# Patient Record
Sex: FEMALE | URBAN - METROPOLITAN AREA
[De-identification: names, ages, dates, MRNs, and addresses within clinical notes are randomized per-mention and may not be internally consistent; named-entity substitution may affect disease eponyms.]

---

## 2017-08-03 ENCOUNTER — APPOINTMENT (RX ONLY)
Dept: URBAN - METROPOLITAN AREA CLINIC 15 | Facility: CLINIC | Age: 32
Setting detail: DERMATOLOGY
End: 2017-08-03

## 2017-08-03 DIAGNOSIS — Z41.9 ENCOUNTER FOR PROCEDURE FOR PURPOSES OTHER THAN REMEDYING HEALTH STATE, UNSPECIFIED: ICD-10-CM

## 2017-08-03 PROBLEM — E03.9 HYPOTHYROIDISM, UNSPECIFIED: Status: ACTIVE | Noted: 2017-08-03

## 2017-08-03 PROCEDURE — ? COOLSCULPTING

## 2017-08-03 PROCEDURE — ? ADDITIONAL NOTES

## 2017-08-03 NOTE — PROCEDURE: COOLSCULPTING
Patient Weight-Include Units (Optional): [address] [facility]
Consent: Written consent obtained, risks reviewed including but not limited to blistering from suction, darker or lighter pigmentary change, bruising, and/or need for multiple treatments.
Post Treatment: After treatment, the suction was turned off, and the applicator was removed from the skin. \\nA massage of 2 minutes of duration was performed area immediately after removing the applicators. \\nTreatment was well tolerated without complications. \\n\\nPatient reported 0 pain by the time of leaving. The area had mild erythema; it was numbed and had no bruising, blanching or signs of epidermal damage. \\nI gave home instructions with our phone and email contacts and I suggested drinking a lot of water, avoiding anti-inflammatories for 2 weeks (except Tylenol), Vitamin D3 3,000 UI, Vitamin C 1,000 mg and Zinc 50 mg once a day for 3 months and follow up in 1 month.
Post-Care Instructions: I reviewed with the patient in detail post-care instructions. Patient should stay away from the sun and wear sun protection until treated areas are fully healed.
Indication: non-invasive fat removal
Detail Level: Zone
Price (Use Numbers Only, No Special Characters Or $): 0
Intro: Patient was here for CoolSculpting treatment on the arms. \\nPatient signed the consent form, photo release form and had the opportunity to ask questions. Baseline pictures were taken. \\nWeight: 150 lb. \\n\\nPrior to treatment, the area was cleaned with alcohol and marked out with a marking pen. The gel sheet was then applied uniformly. The applicator was applied to the skin with good contact and suction.
Device: [email or web address]

## 2017-09-22 ENCOUNTER — APPOINTMENT (RX ONLY)
Dept: URBAN - METROPOLITAN AREA CLINIC 15 | Facility: CLINIC | Age: 32
Setting detail: DERMATOLOGY
End: 2017-09-22

## 2017-09-22 DIAGNOSIS — Z41.9 ENCOUNTER FOR PROCEDURE FOR PURPOSES OTHER THAN REMEDYING HEALTH STATE, UNSPECIFIED: ICD-10-CM

## 2017-09-22 PROCEDURE — ? COOLSCULPTING

## 2017-09-22 PROCEDURE — ? ADDITIONAL NOTES

## 2017-09-22 NOTE — PROCEDURE: COOLSCULPTING
Price (Use Numbers Only, No Special Characters Or $): 0
Intro: Patient was here for CoolSculpting treatment on the upper and lower abdomen, inner thighs areas. \\nPatient signed the consent form, photo release form and had the opportunity to ask questions. Baseline pictures were taken. \\nWeight: 138 lb. \\n\\nPrior to treatment, the area was cleaned with alcohol (pretreatment skin wipes) and marked out with a marking pen. The gel sheet was then applied uniformly. The applicator was applied to the skin with good contact and suction. \\n\\n.
Detail Level: Zone
Post Treatment: After treatment, the suction was turned off, and the applicator was removed from the skin. \\nA massage of 2 minutes of duration was performed area immediately after removing the applicators. \\nTreatment was well tolerated without complications. \\n\\nPatient reported 0 pain by the time of leaving. The area had mild erythema; it was numbed and had no bruising, blanching or signs of epidermal damage. \\nI gave home instructions with our phone and email contacts and I suggested drinking a lot of water, avoiding anti-inflammatories for 2 weeks (except Tylenol), Vitamin D3 3,000 UI, Vitamin C 1,000 mg and Zinc 50 mg once a day for 3 months and follow up in 1 month.
Indication: non-invasive fat removal
Post-Care Instructions: I reviewed with the patient in detail post-care instructions. Patient should stay away from the sun and wear sun protection until treated areas are fully healed.
Device: [employer]
Consent: Written consent obtained, risks reviewed including but not limited to blistering from suction, darker or lighter pigmentary change, bruising, and/or need for multiple treatments.

## 2017-09-22 NOTE — PROCEDURE: ADDITIONAL NOTES
Additional Notes: COOLSCULPTING TREATMENT / PRICES\\n\\nCOOL EVENT: upper abdomen: cooladvantage 6.3 x 2 \\n                         Lower abdomen: [employer]advantage 6.3 x 2 $ 1920\\n\\nExtra area:\\nInner thighs: [employer]advant[employer] 6.5 x 2 $1200

## 2023-05-18 NOTE — PROCEDURE: ADDITIONAL NOTES
Additional Notes: COOLSCULPTING TREATMENT / PRICES\\nArms: [employer]advantage 6.5 x 2 $1200 [Negative] : Heme/Lymph [[email or web address]] : See HPI